# Patient Record
Sex: FEMALE | URBAN - METROPOLITAN AREA
[De-identification: names, ages, dates, MRNs, and addresses within clinical notes are randomized per-mention and may not be internally consistent; named-entity substitution may affect disease eponyms.]

---

## 2023-10-24 ENCOUNTER — SCHEDULED TELEPHONE ENCOUNTER (OUTPATIENT)
Age: 33
End: 2023-10-24
Payer: COMMERCIAL

## 2023-10-24 DIAGNOSIS — N62 MACROMASTIA: Primary | ICD-10-CM

## 2023-10-24 PROCEDURE — 99202 OFFICE O/P NEW SF 15 MIN: CPT | Performed by: PLASTIC SURGERY

## 2023-10-24 NOTE — PROGRESS NOTES
Plastic Surgery Consultation     Documentation:  I communicated with the patient and/or health care decision maker about symptomatic macromastia. Details of this discussion including any medical advice provided: bilateral breast reduction    Total Time: minutes: 21-30 minutes    Rm Elkins was evaluated through a synchronous (real-time) audio encounter. Patient identification was verified at the start of the visit. She (or guardian if applicable) is aware that this is a billable service, which includes applicable co-pays. This visit was conducted with the patient's (and/or legal guardian's) verbal consent. She has not had a related appointment within my department in the past 7 days or scheduled within the next 24 hours. The patient was located at Home: No address on file. .  The provider was located at [address],  [address] [place] [address]. Note: not billable if this call serves to triage the patient into an appointment for the relevant concern  {STOP! Confirm you are appropriately licensed, registered, or certified to deliver care in the state where the patient is located as indicated above. If you are not or unsure, please re-schedule the visit. Rm Elkins is a 28 y.o. female evaluated via telephone on 10/24/2023 for symptomatic macromastia. Jolie Escalante MD        Chief Complaint  Symptomatic Macromastia     Reason for visit  Discuss bilateral breast reduction     History of present illness  Nimesh Arroyo is a 28 y.o. female with a past medical history of anxiety, GERD, asthma, spinal stenosis , herniated cervical disc (s/p neck injections) ,  section and abdominal hernia repair who presents with signs and symptoms of macromastia.   Patient with a family history of breast cancer and at a high risk of breast cancer (Tonya Becker lifetime risk is 25.5%), at this time she wants to pursue the noninvasive management options for

## 2023-10-26 ENCOUNTER — TELEPHONE (OUTPATIENT)
Age: 33
End: 2023-10-26

## 2023-10-26 NOTE — TELEPHONE ENCOUNTER
SPOKE TO FOR [address] @ [facility].  NOTIFIED OF DATES, TIMES AND LOCATION    PHONE ASSESSMENT   SURGERY -12/1/23 AT 10AM  P/O -12/12/23 AT 1PM    NPO AFTER MIDNIGHT    HOLD BLOOD THINNERS - N/A

## 2023-11-28 RX ORDER — PANTOPRAZOLE SODIUM 40 MG/1
40 TABLET, DELAYED RELEASE ORAL DAILY
COMMUNITY
Start: 2023-07-29

## 2023-11-28 RX ORDER — SUMATRIPTAN 100 MG/1
100 TABLET, FILM COATED ORAL
COMMUNITY

## 2023-11-28 RX ORDER — LEVONORGESTREL 52 MG/1
1 INTRAUTERINE DEVICE INTRAUTERINE ONCE
COMMUNITY

## 2023-11-28 RX ORDER — HYDROXYZINE 50 MG/1
50 TABLET, FILM COATED ORAL EVERY 6 HOURS PRN
COMMUNITY

## 2023-11-28 RX ORDER — ALBUTEROL SULFATE 90 UG/1
2 AEROSOL, METERED RESPIRATORY (INHALATION) EVERY 6 HOURS PRN
COMMUNITY

## 2023-11-28 RX ORDER — LANOLIN ALCOHOL/MO/W.PET/CERES
CREAM (GRAM) TOPICAL
COMMUNITY

## 2023-11-30 ENCOUNTER — ANESTHESIA EVENT (OUTPATIENT)
Dept: OPERATING ROOM | Age: 33
End: 2023-11-30
Payer: COMMERCIAL

## 2023-12-01 ENCOUNTER — HOSPITAL ENCOUNTER (OUTPATIENT)
Age: 33
Setting detail: OUTPATIENT SURGERY
Discharge: HOME | End: 2023-12-01
Attending: PLASTIC SURGERY | Admitting: PLASTIC SURGERY
Payer: COMMERCIAL

## 2023-12-01 ENCOUNTER — ANESTHESIA (OUTPATIENT)
Dept: OPERATING ROOM | Age: 33
End: 2023-12-01
Payer: COMMERCIAL

## 2023-12-01 VITALS
RESPIRATION RATE: 18 BRPM | SYSTOLIC BLOOD PRESSURE: 122 MMHG | TEMPERATURE: 97.6 F | DIASTOLIC BLOOD PRESSURE: 72 MMHG | OXYGEN SATURATION: 97 % | HEIGHT: 66 IN | WEIGHT: 198 LBS | BODY MASS INDEX: 31.82 KG/M2 | HEART RATE: 68 BPM

## 2023-12-01 DIAGNOSIS — Z98.890 POST-OPERATIVE STATE: Primary | ICD-10-CM

## 2023-12-01 DIAGNOSIS — N62 MACROMASTIA: ICD-10-CM

## 2023-12-01 LAB
BASOPHILS ABSOLUTE: 0.1 K/CU MM
BASOPHILS RELATIVE PERCENT: 0.8 % (ref 0–1)
DIFFERENTIAL TYPE: ABNORMAL
EOSINOPHILS ABSOLUTE: 0.2 K/CU MM
EOSINOPHILS RELATIVE PERCENT: 1.9 % (ref 0–3)
HCT VFR BLD CALC: 37.3 % (ref 37–47)
HEMOGLOBIN: 12.9 GM/DL (ref 12.5–16)
IMMATURE NEUTROPHIL %: 0.3 % (ref 0–0.43)
LYMPHOCYTES ABSOLUTE: 3.2 K/CU MM
LYMPHOCYTES RELATIVE PERCENT: 41.7 % (ref 24–44)
MCH RBC QN AUTO: 32.7 PG (ref 27–31)
MCHC RBC AUTO-ENTMCNC: 34.6 % (ref 32–36)
MCV RBC AUTO: 94.7 FL (ref 78–100)
MONOCYTES ABSOLUTE: 0.6 K/CU MM
MONOCYTES RELATIVE PERCENT: 7.8 % (ref 0–4)
NUCLEATED RBC %: 0 %
PDW BLD-RTO: 12 % (ref 11.7–14.9)
PLATELET # BLD: 237 K/CU MM (ref 140–440)
PMV BLD AUTO: 10.1 FL (ref 7.5–11.1)
PREGNANCY TEST URINE, POC: NEGATIVE
RBC # BLD: 3.94 M/CU MM (ref 4.2–5.4)
SEGMENTED NEUTROPHILS ABSOLUTE COUNT: 3.7 K/CU MM
SEGMENTED NEUTROPHILS RELATIVE PERCENT: 47.5 % (ref 36–66)
TOTAL IMMATURE NEUTOROPHIL: 0.02 K/CU MM
TOTAL NUCLEATED RBC: 0 K/CU MM
WBC # BLD: 7.7 K/CU MM (ref 4–10.5)

## 2023-12-01 PROCEDURE — 6370000000 HC RX 637 (ALT 250 FOR IP): Performed by: ANESTHESIOLOGY

## 2023-12-01 PROCEDURE — 7100000000 HC PACU RECOVERY - FIRST 15 MIN: Performed by: PLASTIC SURGERY

## 2023-12-01 PROCEDURE — 7100000001 HC PACU RECOVERY - ADDTL 15 MIN: Performed by: PLASTIC SURGERY

## 2023-12-01 PROCEDURE — 3700000000 HC ANESTHESIA ATTENDED CARE: Performed by: PLASTIC SURGERY

## 2023-12-01 PROCEDURE — 6360000002 HC RX W HCPCS

## 2023-12-01 PROCEDURE — 7100000010 HC PHASE II RECOVERY - FIRST 15 MIN: Performed by: PLASTIC SURGERY

## 2023-12-01 PROCEDURE — 6360000002 HC RX W HCPCS: Performed by: ANESTHESIOLOGY

## 2023-12-01 PROCEDURE — 3700000001 HC ADD 15 MINUTES (ANESTHESIA): Performed by: PLASTIC SURGERY

## 2023-12-01 PROCEDURE — 2709999900 HC NON-CHARGEABLE SUPPLY: Performed by: PLASTIC SURGERY

## 2023-12-01 PROCEDURE — 7100000011 HC PHASE II RECOVERY - ADDTL 15 MIN: Performed by: PLASTIC SURGERY

## 2023-12-01 PROCEDURE — 88305 TISSUE EXAM BY PATHOLOGIST: CPT | Performed by: PATHOLOGY

## 2023-12-01 PROCEDURE — 3600000005 HC SURGERY LEVEL 5 BASE: Performed by: PLASTIC SURGERY

## 2023-12-01 PROCEDURE — 19318 BREAST REDUCTION: CPT | Performed by: PLASTIC SURGERY

## 2023-12-01 PROCEDURE — 2500000003 HC RX 250 WO HCPCS

## 2023-12-01 PROCEDURE — 2580000003 HC RX 258

## 2023-12-01 PROCEDURE — 85025 COMPLETE CBC W/AUTO DIFF WBC: CPT

## 2023-12-01 PROCEDURE — 3600000015 HC SURGERY LEVEL 5 ADDTL 15MIN: Performed by: PLASTIC SURGERY

## 2023-12-01 PROCEDURE — 81025 URINE PREGNANCY TEST: CPT

## 2023-12-01 PROCEDURE — 2580000003 HC RX 258: Performed by: ANESTHESIOLOGY

## 2023-12-01 RX ORDER — ONDANSETRON 4 MG/1
4 TABLET, ORALLY DISINTEGRATING ORAL 3 TIMES DAILY PRN
Qty: 21 TABLET | Refills: 0 | Status: SHIPPED | OUTPATIENT
Start: 2023-12-01

## 2023-12-01 RX ORDER — SODIUM CHLORIDE, SODIUM LACTATE, POTASSIUM CHLORIDE, CALCIUM CHLORIDE 600; 310; 30; 20 MG/100ML; MG/100ML; MG/100ML; MG/100ML
INJECTION, SOLUTION INTRAVENOUS CONTINUOUS
Status: DISCONTINUED | OUTPATIENT
Start: 2023-12-01 | End: 2023-12-01 | Stop reason: HOSPADM

## 2023-12-01 RX ORDER — ROCURONIUM BROMIDE 10 MG/ML
INJECTION, SOLUTION INTRAVENOUS PRN
Status: DISCONTINUED | OUTPATIENT
Start: 2023-12-01 | End: 2023-12-01 | Stop reason: SDUPTHER

## 2023-12-01 RX ORDER — HYDROCODONE BITARTRATE AND ACETAMINOPHEN 5; 325 MG/1; MG/1
1 TABLET ORAL ONCE
Status: COMPLETED | OUTPATIENT
Start: 2023-12-01 | End: 2023-12-01

## 2023-12-01 RX ORDER — PROPOFOL 10 MG/ML
INJECTION, EMULSION INTRAVENOUS CONTINUOUS PRN
Status: DISCONTINUED | OUTPATIENT
Start: 2023-12-01 | End: 2023-12-01 | Stop reason: SDUPTHER

## 2023-12-01 RX ORDER — PROPOFOL 10 MG/ML
INJECTION, EMULSION INTRAVENOUS PRN
Status: DISCONTINUED | OUTPATIENT
Start: 2023-12-01 | End: 2023-12-01 | Stop reason: SDUPTHER

## 2023-12-01 RX ORDER — FENTANYL CITRATE 50 UG/ML
INJECTION, SOLUTION INTRAMUSCULAR; INTRAVENOUS PRN
Status: DISCONTINUED | OUTPATIENT
Start: 2023-12-01 | End: 2023-12-01 | Stop reason: SDUPTHER

## 2023-12-01 RX ORDER — SODIUM CHLORIDE 0.9 % (FLUSH) 0.9 %
5-40 SYRINGE (ML) INJECTION PRN
Status: DISCONTINUED | OUTPATIENT
Start: 2023-12-01 | End: 2023-12-01 | Stop reason: HOSPADM

## 2023-12-01 RX ORDER — LABETALOL HYDROCHLORIDE 5 MG/ML
10 INJECTION, SOLUTION INTRAVENOUS
Status: DISCONTINUED | OUTPATIENT
Start: 2023-12-01 | End: 2023-12-01 | Stop reason: HOSPADM

## 2023-12-01 RX ORDER — DEXAMETHASONE SODIUM PHOSPHATE 4 MG/ML
INJECTION, SOLUTION INTRA-ARTICULAR; INTRALESIONAL; INTRAMUSCULAR; INTRAVENOUS; SOFT TISSUE PRN
Status: DISCONTINUED | OUTPATIENT
Start: 2023-12-01 | End: 2023-12-01 | Stop reason: SDUPTHER

## 2023-12-01 RX ORDER — ESMOLOL HYDROCHLORIDE 10 MG/ML
INJECTION INTRAVENOUS PRN
Status: DISCONTINUED | OUTPATIENT
Start: 2023-12-01 | End: 2023-12-01 | Stop reason: SDUPTHER

## 2023-12-01 RX ORDER — SODIUM CHLORIDE 9 MG/ML
INJECTION, SOLUTION INTRAVENOUS PRN
Status: DISCONTINUED | OUTPATIENT
Start: 2023-12-01 | End: 2023-12-01 | Stop reason: HOSPADM

## 2023-12-01 RX ORDER — OXYCODONE HYDROCHLORIDE 5 MG/1
5 TABLET ORAL EVERY 6 HOURS PRN
Qty: 20 TABLET | Refills: 0 | Status: SHIPPED | OUTPATIENT
Start: 2023-12-01 | End: 2023-12-08

## 2023-12-01 RX ORDER — SODIUM CHLORIDE 0.9 % (FLUSH) 0.9 %
5-40 SYRINGE (ML) INJECTION EVERY 12 HOURS SCHEDULED
Status: DISCONTINUED | OUTPATIENT
Start: 2023-12-01 | End: 2023-12-01 | Stop reason: HOSPADM

## 2023-12-01 RX ORDER — ONDANSETRON 2 MG/ML
4 INJECTION INTRAMUSCULAR; INTRAVENOUS
Status: DISCONTINUED | OUTPATIENT
Start: 2023-12-01 | End: 2023-12-01 | Stop reason: HOSPADM

## 2023-12-01 RX ORDER — DEXMEDETOMIDINE HYDROCHLORIDE 100 UG/ML
INJECTION, SOLUTION INTRAVENOUS PRN
Status: DISCONTINUED | OUTPATIENT
Start: 2023-12-01 | End: 2023-12-01 | Stop reason: SDUPTHER

## 2023-12-01 RX ORDER — DOCUSATE SODIUM 100 MG/1
100 CAPSULE, LIQUID FILLED ORAL DAILY PRN
Qty: 30 CAPSULE | Refills: 0 | Status: SHIPPED | OUTPATIENT
Start: 2023-12-01

## 2023-12-01 RX ORDER — ONDANSETRON 2 MG/ML
INJECTION INTRAMUSCULAR; INTRAVENOUS PRN
Status: DISCONTINUED | OUTPATIENT
Start: 2023-12-01 | End: 2023-12-01 | Stop reason: SDUPTHER

## 2023-12-01 RX ORDER — FENTANYL CITRATE 50 UG/ML
25 INJECTION, SOLUTION INTRAMUSCULAR; INTRAVENOUS EVERY 5 MIN PRN
Status: DISCONTINUED | OUTPATIENT
Start: 2023-12-01 | End: 2023-12-01 | Stop reason: HOSPADM

## 2023-12-01 RX ORDER — ONDANSETRON 2 MG/ML
4 INJECTION INTRAMUSCULAR; INTRAVENOUS
Status: COMPLETED | OUTPATIENT
Start: 2023-12-01 | End: 2023-12-01

## 2023-12-01 RX ORDER — HYDRALAZINE HYDROCHLORIDE 20 MG/ML
10 INJECTION INTRAMUSCULAR; INTRAVENOUS
Status: DISCONTINUED | OUTPATIENT
Start: 2023-12-01 | End: 2023-12-01 | Stop reason: HOSPADM

## 2023-12-01 RX ORDER — FENTANYL CITRATE 50 UG/ML
50 INJECTION, SOLUTION INTRAMUSCULAR; INTRAVENOUS EVERY 5 MIN PRN
Status: DISCONTINUED | OUTPATIENT
Start: 2023-12-01 | End: 2023-12-01 | Stop reason: HOSPADM

## 2023-12-01 RX ADMIN — ONDANSETRON 4 MG: 2 INJECTION INTRAMUSCULAR; INTRAVENOUS at 12:13

## 2023-12-01 RX ADMIN — PROPOFOL 50 MG: 10 INJECTION, EMULSION INTRAVENOUS at 10:25

## 2023-12-01 RX ADMIN — PROPOFOL 150 MG: 10 INJECTION, EMULSION INTRAVENOUS at 10:22

## 2023-12-01 RX ADMIN — SUGAMMADEX 50 MG: 100 INJECTION, SOLUTION INTRAVENOUS at 11:25

## 2023-12-01 RX ADMIN — SODIUM CHLORIDE, POTASSIUM CHLORIDE, SODIUM LACTATE AND CALCIUM CHLORIDE: 600; 310; 30; 20 INJECTION, SOLUTION INTRAVENOUS at 08:32

## 2023-12-01 RX ADMIN — FENTANYL CITRATE 50 MCG: 50 INJECTION, SOLUTION INTRAMUSCULAR; INTRAVENOUS at 12:19

## 2023-12-01 RX ADMIN — FENTANYL CITRATE 50 MCG: 50 INJECTION, SOLUTION INTRAMUSCULAR; INTRAVENOUS at 12:31

## 2023-12-01 RX ADMIN — FENTANYL CITRATE 50 MCG: 50 INJECTION, SOLUTION INTRAMUSCULAR; INTRAVENOUS at 10:20

## 2023-12-01 RX ADMIN — FENTANYL CITRATE 50 MCG: 50 INJECTION, SOLUTION INTRAMUSCULAR; INTRAVENOUS at 10:35

## 2023-12-01 RX ADMIN — ROCURONIUM BROMIDE 10 MG: 10 INJECTION, SOLUTION INTRAVENOUS at 10:38

## 2023-12-01 RX ADMIN — PROPOFOL 200 MCG/KG/MIN: 10 INJECTION, EMULSION INTRAVENOUS at 10:26

## 2023-12-01 RX ADMIN — DEXMEDETOMIDINE 10 MCG: 100 INJECTION, SOLUTION INTRAVENOUS at 10:13

## 2023-12-01 RX ADMIN — ONDANSETRON 4 MG: 2 INJECTION INTRAMUSCULAR; INTRAVENOUS at 11:20

## 2023-12-01 RX ADMIN — HYDROMORPHONE HYDROCHLORIDE 0.5 MG: 1 INJECTION, SOLUTION INTRAMUSCULAR; INTRAVENOUS; SUBCUTANEOUS at 11:47

## 2023-12-01 RX ADMIN — ESMOLOL HYDROCHLORIDE 40 MG: 100 INJECTION, SOLUTION INTRAVENOUS at 10:52

## 2023-12-01 RX ADMIN — DEXAMETHASONE SODIUM PHOSPHATE 8 MG: 4 INJECTION, SOLUTION INTRAMUSCULAR; INTRAVENOUS at 10:33

## 2023-12-01 RX ADMIN — CEFAZOLIN 2000 MG: 2 INJECTION, POWDER, FOR SOLUTION INTRAMUSCULAR; INTRAVENOUS at 10:35

## 2023-12-01 RX ADMIN — SUGAMMADEX 150 MG: 100 INJECTION, SOLUTION INTRAVENOUS at 11:41

## 2023-12-01 RX ADMIN — ROCURONIUM BROMIDE 40 MG: 10 INJECTION, SOLUTION INTRAVENOUS at 10:22

## 2023-12-01 RX ADMIN — ROCURONIUM BROMIDE 10 MG: 10 INJECTION, SOLUTION INTRAVENOUS at 11:17

## 2023-12-01 RX ADMIN — DEXMEDETOMIDINE 10 MCG: 100 INJECTION, SOLUTION INTRAVENOUS at 10:36

## 2023-12-01 RX ADMIN — HYDROCODONE BITARTRATE AND ACETAMINOPHEN 1 TABLET: 5; 325 TABLET ORAL at 13:42

## 2023-12-01 ASSESSMENT — PAIN DESCRIPTION - FREQUENCY
FREQUENCY: CONTINUOUS

## 2023-12-01 ASSESSMENT — PAIN SCALES - GENERAL
PAINLEVEL_OUTOF10: 7
PAINLEVEL_OUTOF10: 8
PAINLEVEL_OUTOF10: 7
PAINLEVEL_OUTOF10: 10
PAINLEVEL_OUTOF10: 5
PAINLEVEL_OUTOF10: 10

## 2023-12-01 ASSESSMENT — PAIN - FUNCTIONAL ASSESSMENT
PAIN_FUNCTIONAL_ASSESSMENT: PREVENTS OR INTERFERES SOME ACTIVE ACTIVITIES AND ADLS
PAIN_FUNCTIONAL_ASSESSMENT: 0-10

## 2023-12-01 ASSESSMENT — PAIN DESCRIPTION - LOCATION
LOCATION: BREAST
LOCATION: CHEST;BREAST
LOCATION: BREAST

## 2023-12-01 ASSESSMENT — PAIN DESCRIPTION - ONSET
ONSET: ON-GOING

## 2023-12-01 ASSESSMENT — PAIN DESCRIPTION - DESCRIPTORS
DESCRIPTORS: BURNING

## 2023-12-01 ASSESSMENT — PAIN DESCRIPTION - ORIENTATION
ORIENTATION: RIGHT;LEFT
ORIENTATION: LEFT;RIGHT
ORIENTATION: RIGHT;LEFT

## 2023-12-01 ASSESSMENT — PAIN DESCRIPTION - PAIN TYPE
TYPE: SURGICAL PAIN

## 2023-12-01 NOTE — H&P
Plastic Surgery      Chief Complaint  Symptomatic Macromastia    Reason for visit  Discuss bilateral breast reduction    History of present illness  Evgeny Ferreira is a 28 y.o. female with a past medical history of anxiety, GERD, asthma, spinal stenosis , herniated cervical disc (s/p neck injections) ,  section and abdominal hernia repair who presents with signs and symptoms of macromastia. Patient with a family history of breast cancer and at a high risk of breast cancer ([facility] lifetime risk is 25.5%), at this time she wants to pursue the noninvasive management options for annual high risk screening but would like to discuss a breast reduction to address her macromastia. She reports 38DDD breasts which have been stable in size over the last year. She states that her breasts cause a pulling sensation on the neck and upper back leading to pain. She reports persistent redness and erythema below the breasts but has not been treated for infections in the medical setting (i.e., has not required topical or oral antifungal agents) and has not developed areas of skin ulceration that required local wound care. She notes marked bra strap grooving and must wear two athletic bras to facilitate work/normal daily activities. She has attempted nonsteroidal anti-inflammatory drugs to treat her pain without sustained or substantial improvement. She reports she has stopped smoking 2 months ago and denies any alcohol use. Patient seen and examined denies any chest pain, shortness of breath or history of DVT. No interval changes since last visit. BREAST - REVIEW OF SYSTEMS  Neck pain  Shoulder pain  Upper or lower back pain  Headaches  Persistent redness or erythema below the breasts  Restriction of physical activity due to breast size  Severe bra strap grooving or ulceration at shoulder  Upper back slouching or hunching    REVIEW OF SYSTEMS  Review of Systems   Constitutional: Negative.    Respiratory: nontender, nondistended without significant surgical scar. Extremities: Palpable peripheral pulses without edema. Laboratory studies  All lab studies reviewed. Radiology  Anatomical Region Laterality Modality   Breast bilateral Mammography     Impression    CATEGORY/ASSESSMENT/IMPRESSION: (2) Benign. RECOMMENDATION: Follow-up screening mammography is recommended in one year. DICTATED BY: Kristin Kumar. Juan Salas M.D.    Stefanikayleen Bee result summary letter will be sent to the patient. The patient has been entered into a reminder system with a target due date for the next mammogram.Workstation ID:[id number]  Narrative    Digital bilateral diagnostic mammography with CAD with tomosynthesis. 9/30/2022 2:05 PM.    INDICATION: Follow-up of suspected left breast intramammary lymph node. COMPARISON: 11/16/21, 10/14/21, 07/15/20, 05/31/19. VIEWS: Bilateral CC and MLO. BREAST COMPOSITION: There are scattered fibroglandular elements. FINDINGS: There is no suspicious finding present within either breast. Subcentimeter circumscribed mass within the upper inner quadrant of the left breast is unchanged and most consistent with a benign intramammary lymph node. Procedure Note    Kari Rangel MD - 09/30/2022  Formatting of this note might be different from the original.  Digital bilateral diagnostic mammography with CAD with tomosynthesis. 9/30/2022 2:05 PM.    INDICATION: Follow-up of suspected left breast intramammary lymph node. COMPARISON: 11/16/21, 10/14/21, 07/15/20, 05/31/19. VIEWS: Bilateral CC and MLO. BREAST COMPOSITION: There are scattered fibroglandular elements. FINDINGS: There is no suspicious finding present within either breast. Subcentimeter circumscribed mass within the upper inner quadrant of the left breast is unchanged and most consistent with a benign intramammary lymph node. CATEGORY/ASSESSMENT/IMPRESSION: (2) Benign.     RECOMMENDATION: Follow-up screening mammography is

## 2023-12-01 NOTE — ANESTHESIA POSTPROCEDURE EVALUATION
Department of Anesthesiology  Postprocedure Note    Patient: René De Anda  MRN: [id number]  YOB: 1990  Date of evaluation: 12/1/2023      Procedure Summary     Date: 12/01/23 Room / Location: [address] [place] [address] [address] [facility]    Anesthesia Start: 0244 Anesthesia Stop: 9852    Procedure: BILATERAL BREAST REDUCTION (Bilateral: Breast) Diagnosis:       Macromastia      (Macromastia [N62])    Surgeons: Jose Ramon Saucedo MD Responsible Provider: Darya Tovar MD    Anesthesia Type: general ASA Status: 2          Anesthesia Type: No value filed.     Gregorio Phase I: Gregorio Score: 10    Gregorio Phase II:        Anesthesia Post Evaluation    Patient location during evaluation: PACU  Patient participation: complete - patient participated  Level of consciousness: sleepy but conscious  Pain score: 3  Airway patency: patent  Nausea & Vomiting: no nausea and no vomiting  Complications: no  Cardiovascular status: hemodynamically stable  Respiratory status: acceptable  Hydration status: euvolemic  Pain management: adequate

## 2023-12-01 NOTE — PROGRESS NOTES
1310 - received patient from pacu, report received from [facility]. Patient A&O, rates pain 10/10, feeling slightly nauseated from pain. Patient wearing surgical bra and has 2 darshana drains. Steri strips on incisions have a small amount of blood. [phone] - notified Dr. Miriam Lei regarding pain, orders received. 1342 patient given pain meds see mar  [phone] - Discharge instructions given to patient and family, understanding voiced without any further questions at this time. Instructions given on darshana drain care. 1445 - patient up to bathroom. [phone] - patient left sds via wheelchair accompanied by tech.

## 2023-12-01 NOTE — PROGRESS NOTES
Outpatient Pharmacy Progress Note for Meds-to-Beds    Total number of Prescriptions Filled: 3  The following medications were dispensed to the patient during the discharge process:  docusate sodium  ondansetron  oxyCODONE    Additional Documentation:  Medication(s) were delivered to the patient's room prior to discharge      Thank you for letting us serve your patients.   [address]    [address] [place] [address], [address] [place] [address]    Phone: [phone]    Fax: [phone]

## 2023-12-01 NOTE — PROGRESS NOTES
1201 Patient arrived to PACU from OR. Monitors applied and alarms on. No drainage from sites. Report from CRNA. 1215 Patient turned and repositioned in bed.  1240 Patient tolerating ice chips. 1302 Patient transferred out of PACU to same day surgery. Report to Lorena Gaxiola.

## 2023-12-01 NOTE — OP NOTE
DATE OF OPERATION:   12/1/2023       SURGEON:   Jose Ramon Saucedo MD      OPERATION PERFORMED:   BBR    PREOPERATIVE DIAGNOSIS:   Symptomatic Macromastia    POSTOPERATIVE DIAGNOSIS:   JADA    DRAINS (KIND AND NUMBER):  2 BRENDA drains; 1 at either breast   SPONGE COUNT VERIFIED:   Verified yes, and correct. ESTIMATED BLOOD LOSS:   300  IMPLANTS:    none  COMPLICATIONS:    None. WOUND TYPE:    Clean  CONDITION:     Good. Complications:   none    Condition:   stable    INDICATIONS FOR PROCEDURE:32 year old female with symptomatic macromastia presents for bilateral breast reduction. All of her questions were answered and she elected to proceed. Consent was verified. DESCRIPTION OF OPERATION:   Patient was brought into the OR and transferred onto the OR table in a supine fashion. Pressure points were padded, spontaneous compression devices were placed. Pre-operative antibiotic were given. General endotracheal anesthesia was induced, patient tolerated induction well. Patient was prepped and draped in the usual sterile fashion and a final time out was performed. A superior medial pedicle with an 7cm base was marked within the Wise pattern. To do this, a 42-mm cookie cutter was used to outline the new nipple-areolar complex. Then, the pedicle was de-epithelialized. Next, the incisions were made and the pedicle was developed to the chest wall. The tissue superior to the new nipple areola complex and within the markings of the wise pattern resection were excised. The right breast was bigger and the resection was larger . The right resection weight was 1000 grams and the left 800grams. Due to bilateral nature of the case and to limit general anesthesia time Dr. Lopez Deal completed the left side and will be dictated by him. Symmetry was confirmed. The wound was thoroughly irrigated, the irrigation removed via suction, and hemostasis achieved with cautery.  The pedicle was secured superiorly and the

## 2023-12-01 NOTE — OP NOTE
DATE OF OPERATION:   12/1/2023        SURGEON:   Elaine Swift MD        OPERATION PERFORMED:   Bilateral breast reduction     PREOPERATIVE DIAGNOSIS:   Symptomatic Macromastia     POSTOPERATIVE DIAGNOSIS:   JADA     DRAINS (KIND AND NUMBER):       2 BRENDA drains; 1 at either breast   SPONGE COUNT VERIFIED:            Verified yes, and correct. ESTIMATED BLOOD LOSS:             300  IMPLANTS:                                        none  COMPLICATIONS:                            None. WOUND TYPE:                                  Clean  CONDITION:                                      Good. Complications:   none     Condition:   stable     INDICATIONS FOR PROCEDURE:32 year old female with symptomatic macromastia presents for bilateral breast reduction. All of her questions were answered and she elected to proceed. Consent was verified. DESCRIPTION OF OPERATION:   Patient was brought into the OR and transferred onto the OR table in a supine fashion. Pressure points were padded, spontaneous compression devices were placed. Pre-operative antibiotic were given. General endotracheal anesthesia was induced, patient tolerated induction well. Patient was prepped and draped in the usual sterile fashion and a final time out was performed. A superior medial pedicle with an 7cm base was marked within the Wise pattern. To do this, a 42-mm cookie cutter was used to outline the new nipple-areolar complex. Then, the pedicle was de-epithelialized. Next, the incisions were made and the pedicle was developed to the chest wall. The tissue superior to the new nipple areola complex and within the markings of the wise pattern resection were excised. The right breast was bigger and the resection was larger . The right resection weight was 1000 grams and the left 800grams.  Due to bilateral nature of the case and to limit general anesthesia time Dr. Pernell Pearson completed the right side and will be dictated by

## 2023-12-01 NOTE — DISCHARGE INSTRUCTIONS
Discharge Instructions      Diet, Nausea, and Vomiting  Remain on clear liquids, non-carbonated beverages, and sparing solid foods the first 12 hours after surgery. After 12 hours, you may resume a regular diet. Nausea and vomiting are common after surgery. The nausea usually resolves within 24 hours. If limiting your diet does not improve the nausea, you will be provided with a medicine to reduce nausea called Zofran. This is to be taken only as needed. Pain Management  Making you comfortable after surgery is a priority for the entire health care team and steps are taken during each step of your care to aid in achieving this goal.  You will be given numbing medicine along the incisions at the time of surgery - this medicine is designed to last up to 3 days. Non-medication steps to take:  Wear your surgical bra/spanks at all times except for showering  Medications provided: It is important to take the non-narcotic and narcotic pain medications after surgery as they work together. Over the counter Tylenol 650mg should be taken every 8 hours on a scheduled basis after surgery for 3 days  Over the counter Motrin 600mg may be taken as needed for pain starting on the second day after surgery  Oxycodone will be provided and can be taken every 6 hours as needed for pain - this should be taken as an adjunct to the steps and medications listed above. This medicine will not be refilled after surgery. Please keep in mind that all narcotic pain medicine has side effects. Side effects may include dizziness, drowsiness, light-headedness, constipation, nausea or vomiting. If the side effects continue or are bothersome, quit taking the medication and use the steps above until you can contact the clinic. Wound Care  Tape: Tape strips (i.e., steri-strips) were applied to the skin overlying the incision. These will come off by themselves in 10-14 days as you begin to shower.  As they start to peel off on their own, you may gently remove them. Bathing: You can shower, but not bathe, beginning 48 hours after surgery was completed. Do not let the incisions sit underwater. This means no swimming, Jacuzzi, or deep baths where the incisions are underwater. Infection: It is normal to get a little redness at the edges of a wound as it heals. However, if you have an increase in pain, temperature greater than 101.5, redness and warmth of the wound, with or without drainage from the wound, the wound may be infected and you should call the office to be seen. Activity  You should avoid strenuous activity but are encouraged to ambulate with help beginning the night of surgery  AVOID: Running, pushing, aerobics, horseback riding, mopping, sweeping, weight lifting, and upper body workouts until given permission by your surgeon. It is normal to get tired very easily after surgery and to have trouble sleeping at night - these problems can last up to six weeks. DRIVING CAUTION: DO NOT DRIVE WITHIN 24 HOURS OF YOUR SURGERY OR WHILE TAKING ANY PRESCRIBED MEDICATIONS.      If you have any questions or concerns please contact your physician or call:     Patt Grider MD  Plastic and Reconstructive Surgeon  [facility]   [phone]    Plastic and Reconstructive Surgery   BRENDA Drain Log     Name:  Date of Birth:        Surgery Date:  Date Time                 Amount                       Drain #1                  Drain #2           Drain   #3 Drain   #4 Drain   #5 Drain  #6

## 2023-12-04 ENCOUNTER — TELEPHONE (OUTPATIENT)
Dept: SURGERY | Age: 33
End: 2023-12-04

## 2023-12-04 NOTE — TELEPHONE ENCOUNTER
Post-op Phone Call Follow-up  JESSICA Pitt Rump is 3 days s/p 3    I called the pt today to see how they were doing post-operatively. The pt's pain is  well controlled with current pain control regimen. Pt's incisions are doing well. Denies erythema, swelling or drainage. Pt is tolerating eating without N/V, and is  having bowel movements. I reviewed the post-operative instructions, addressed any concerns and answered the patient's questions.      I confirmed the patient's post-op appointment on 12/12/23    Pt Stated called [facility] Office due to discomfort when taking a deep breath and nipple is stuck to nicole Yin LPN

## 2023-12-05 ENCOUNTER — OFFICE VISIT (OUTPATIENT)
Age: 33
End: 2023-12-05

## 2023-12-05 ENCOUNTER — HOSPITAL ENCOUNTER (OUTPATIENT)
Dept: GENERAL RADIOLOGY | Age: 33
Discharge: HOME | End: 2023-12-05
Attending: PLASTIC SURGERY
Payer: COMMERCIAL

## 2023-12-05 ENCOUNTER — HOSPITAL ENCOUNTER (OUTPATIENT)
Age: 33
Discharge: HOME | End: 2023-12-05
Attending: PLASTIC SURGERY
Payer: COMMERCIAL

## 2023-12-05 ENCOUNTER — HOSPITAL ENCOUNTER (OUTPATIENT)
Dept: ULTRASOUND IMAGING | Age: 33
Discharge: HOME | End: 2023-12-05
Attending: PLASTIC SURGERY
Payer: COMMERCIAL

## 2023-12-05 ENCOUNTER — TELEPHONE (OUTPATIENT)
Dept: SURGERY | Age: 33
End: 2023-12-05

## 2023-12-05 VITALS — HEIGHT: 66 IN | BODY MASS INDEX: 31.82 KG/M2 | WEIGHT: 198 LBS

## 2023-12-05 DIAGNOSIS — Z98.890 POST-OPERATIVE STATE: ICD-10-CM

## 2023-12-05 DIAGNOSIS — G89.18 POST-OPERATIVE PAIN: Primary | ICD-10-CM

## 2023-12-05 DIAGNOSIS — Z98.890 POST-OPERATIVE STATE: Primary | ICD-10-CM

## 2023-12-05 DIAGNOSIS — I82.A19 ACUTE DEEP VEIN THROMBOSIS (DVT) OF AXILLARY VEIN, UNSPECIFIED LATERALITY (HCC): ICD-10-CM

## 2023-12-05 DIAGNOSIS — I82.A19 ACUTE DEEP VEIN THROMBOSIS (DVT) OF AXILLARY VEIN, UNSPECIFIED LATERALITY (HCC): Primary | ICD-10-CM

## 2023-12-05 PROCEDURE — 71046 X-RAY EXAM CHEST 2 VIEWS: CPT

## 2023-12-05 PROCEDURE — 99024 POSTOP FOLLOW-UP VISIT: CPT | Performed by: PLASTIC SURGERY

## 2023-12-05 PROCEDURE — 93970 EXTREMITY STUDY: CPT

## 2023-12-05 RX ORDER — DIAZEPAM 5 MG/1
5 TABLET ORAL EVERY 8 HOURS PRN
Qty: 20 TABLET | Refills: 0 | Status: SHIPPED | OUTPATIENT
Start: 2023-12-05 | End: 2023-12-15

## 2023-12-05 NOTE — TELEPHONE ENCOUNTER
Per Dr. Joshua Begum to have CXR prior to office visit, informed PT she stated she will be getting the CXR ASAP today

## 2023-12-05 NOTE — TELEPHONE ENCOUNTER
Lloyd Joseph called stating she is having some SOB, hurts on the left side when breathing, not having any wheezing , but using her inhaler (not helping much). Requesting a Chest xray prior to visit today. Informed PT to keep her appt with Dr. Logan Umanzor today , he will evaluate her at the time of her visit.

## 2023-12-05 NOTE — PROGRESS NOTES
Plastic Surgery Post op Note:               Plastic Surgery Post Op Note:     Patient is a 4 days s/p bilateral breast reduction. Reports left side is tight and hard to breath. CXR normal and U/S of BLE is negative. Drains with minimal drainage. Vitals:    12/05/23 1502   BP: Comment: pt declined due to pain   Weight: 89.8 kg (198 lb)   Height: 1.676 m (5' 6\")    NAD  NSR   Breathing is non labored on rA   Excellent breast symmetry, nipples sensate and viable. Drains removed. Incisions covered with steri-strips and minimal strikethrough. EXAMINATION:  TWO X-RAY VIEWS OF THE CHEST     12/5/2023 1:10 pm     COMPARISON:  None     HISTORY:  ORDERING SYSTEM PROVIDED HISTORY:  Post-operative state  TECHNOLOGIST PROVIDED HISTORY:  Reason for Exam:  Shortness of breath  Reason for Exam:  Pt states she had a breast reduction last Friday, pain on  left chest, SOB, left breast is hard and not draining like the right breast.     FINDINGS:  The cardiomediastinal silhouette is normal in size. The lungs are clear. No pleural effusion or pneumothorax is present. No evidence of subdiaphragmatic free air or pneumomediastinum. IMPRESSION:  No acute cardiopulmonary process. Plan:      Continue with lifting restrictions. Allow steri-strips to peel off spontaneously. No lifting <10lbs for another 3 weeks  Will give Valium for anxiety and spasm  RTC 2 weeks.

## 2023-12-11 ENCOUNTER — TELEPHONE (OUTPATIENT)
Dept: SURGERY | Age: 33
End: 2023-12-11

## 2023-12-11 NOTE — TELEPHONE ENCOUNTER
Pt called stating \"fishy\" oder coming from left breast incision. Yellow- green discharge, blush and puffy. Pt denies fever at this time. Dr. Bo Bradley will be informed     Pt instructed to remove steri strips, wash with soap-water and to keep dry.

## 2023-12-12 RX ORDER — CEFADROXIL 500 MG/1
500 CAPSULE ORAL 2 TIMES DAILY
Qty: 20 CAPSULE | Refills: 0 | Status: SHIPPED | OUTPATIENT
Start: 2023-12-12 | End: 2023-12-22

## 2023-12-14 ENCOUNTER — TELEPHONE (OUTPATIENT)
Dept: SURGERY | Age: 33
End: 2023-12-14

## 2023-12-14 NOTE — TELEPHONE ENCOUNTER
PT called stated Right incision line is bleeding , pitures were sent and forwarded to Dr. Logan Umanzor. Per Dr. Logan Umanzor it is old blood and once drained, bleeding will stop. Pt was advise and educated on post op care.

## 2023-12-31 PROBLEM — Z98.890 POST-OPERATIVE STATE: Status: RESOLVED | Noted: 2023-12-01 | Resolved: 2023-12-31

## 2024-01-30 ENCOUNTER — TELEPHONE (OUTPATIENT)
Dept: BARIATRICS/WEIGHT MGMT | Age: 34
End: 2024-01-30

## 2024-01-30 ENCOUNTER — OFFICE VISIT (OUTPATIENT)
Age: 34
End: 2024-01-30

## 2024-01-30 VITALS — BODY MASS INDEX: 30.25 KG/M2 | WEIGHT: 188.2 LBS | HEART RATE: 62 BPM | OXYGEN SATURATION: 97 % | HEIGHT: 66 IN

## 2024-01-30 DIAGNOSIS — Z48.817 AFTERCARE FOLLOWING SURGERY OF THE SKIN OR SUBCUTANEOUS TISSUE: Primary | ICD-10-CM

## 2024-01-30 PROCEDURE — 99024 POSTOP FOLLOW-UP VISIT: CPT | Performed by: PLASTIC SURGERY

## 2024-01-30 RX ORDER — AMOXICILLIN AND CLAVULANATE POTASSIUM 875; 125 MG/1; MG/1
1 TABLET, FILM COATED ORAL 2 TIMES DAILY
Qty: 14 TABLET | Refills: 0 | Status: SHIPPED | OUTPATIENT
Start: 2024-01-30 | End: 2024-02-06

## 2024-01-30 NOTE — TELEPHONE ENCOUNTER
Patient called in said that antibiotics usually give her a yeast infection wanted to know if something could be called in for that.  Per Dr. Hwang please call in diflucan 150 mg for 3 days.

## 2024-01-30 NOTE — PROGRESS NOTES
Plastic Surgery Post op Note:               Plastic Surgery Post Op Note:     Patient is 6 weeks from bilateral breast reduction.  Patient reports that she has some tenderness and a mass in the retroareolar areolar area to the left breast.  No drainage.  Reports prior areas of delayed wound healing have since healed.    Vitals:    01/30/24 1439   Pulse: 62   SpO2: 97%   Weight: 85.4 kg (188 lb 3.2 oz)   Height: 1.676 m (5' 6\")    NAD  NSR   Breathing is non labored on rA   Excellent symmetry.  Nipple sensate and viable.  Incisions all healed.  On the left over the retroareolar towards the chest wall laterally approximately 5 cm area of induration and tenderness likely area of fat necrosis.      Plan:  I discussed the patient I will give her prescription for Augmentin  For now we will plan to observe the area.  In 6 months from surgery patient will need a mammogram to evaluate and document the area of likely fat necrosis.  I discussed that if I was to intervene on the right now the area of excision will be massive and leave her with a large divot.  I suspect with antibiotics and time the area of fat necrosis will be minimal and may not any need excision.  Continue to monitor.  Did not massage the area and let itself resolved.    Chastity Hwang MD

## 2024-01-30 NOTE — TELEPHONE ENCOUNTER
I called in Diflucan 150 mg QD for 3 days to [facility] in [place], per Dr Hwang.    Called and LM to inform Sandra that Diflucan was sent to pharmacy.

## 2024-02-09 ENCOUNTER — OFFICE VISIT (OUTPATIENT)
Age: 34
End: 2024-02-09

## 2024-02-09 VITALS
SYSTOLIC BLOOD PRESSURE: 130 MMHG | HEART RATE: 80 BPM | OXYGEN SATURATION: 100 % | HEIGHT: 66 IN | DIASTOLIC BLOOD PRESSURE: 80 MMHG | BODY MASS INDEX: 30.22 KG/M2 | WEIGHT: 188 LBS

## 2024-02-09 DIAGNOSIS — Z48.817 AFTERCARE FOLLOWING SURGERY OF THE SKIN OR SUBCUTANEOUS TISSUE: Primary | ICD-10-CM

## 2024-02-09 RX ORDER — CEFADROXIL 500 MG/1
500 CAPSULE ORAL 2 TIMES DAILY
Qty: 14 CAPSULE | Refills: 0 | Status: SHIPPED | OUTPATIENT
Start: 2024-02-09 | End: 2024-02-16

## 2024-02-09 NOTE — PROGRESS NOTES
PLASTIC SURGERY FOLLOW UP NOTE     2/9/2024 10:05 AM    Subjective  Sandra Vidal is a 33 y.o. female s/p bilateral breast reduction (12/1/2023). Since surgery, she has noted a palpable area at the left chest. At her last follow up appointment, she was prescribed antibiotics. Today, the patient reports a reaction to the antibiotics.    Objective  Vitals:    02/09/24 1008   BP: 130/80   Pulse: 80   SpO2: 100%   Weight: 85.3 kg (188 lb)   Height: 1.676 m (5' 6\")     Body mass index is 30.34 kg/m².  Body surface area is 1.99 meters squared.    GEN: NAD    Left beast: NAC viable and in good position. Incisions well approximated. No wounds at the IMF. Palpable nodule centrally consistent with prior visit. Mild erythema at lower portion of breast primarily.     Right breast: NAC viable and in good position. Incisions well approximated. No wounds at the IMF. Mild erythema at lower portion of breast primarily.     Assessment and Plan  Sandra Vidal is a 33 y.o. female s/p bilateral breast reduction 9 weeks ago. Will change to Duricef as the patient tolerated this without issue in December. She agreed and will follow up in 6-8 weeks.      Manohar Haywood MD, [facility]  Plastic & Reconstructive Surgeon  [phone]

## 2024-03-26 ENCOUNTER — OFFICE VISIT (OUTPATIENT)
Age: 34
End: 2024-03-26
Payer: COMMERCIAL

## 2024-03-26 VITALS
HEART RATE: 73 BPM | OXYGEN SATURATION: 95 % | SYSTOLIC BLOOD PRESSURE: 108 MMHG | BODY MASS INDEX: 30.67 KG/M2 | DIASTOLIC BLOOD PRESSURE: 72 MMHG | HEIGHT: 66 IN | WEIGHT: 190.8 LBS

## 2024-03-26 DIAGNOSIS — M79.89 FAT NECROSIS: Primary | ICD-10-CM

## 2024-03-26 PROCEDURE — G8484 FLU IMMUNIZE NO ADMIN: HCPCS | Performed by: PLASTIC SURGERY

## 2024-03-26 PROCEDURE — 99212 OFFICE O/P EST SF 10 MIN: CPT | Performed by: PLASTIC SURGERY

## 2024-03-26 PROCEDURE — G8427 DOCREV CUR MEDS BY ELIG CLIN: HCPCS | Performed by: PLASTIC SURGERY

## 2024-03-26 PROCEDURE — 1036F TOBACCO NON-USER: CPT | Performed by: PLASTIC SURGERY

## 2024-03-26 PROCEDURE — G8417 CALC BMI ABV UP PARAM F/U: HCPCS | Performed by: PLASTIC SURGERY

## 2024-03-26 NOTE — PROGRESS NOTES
Plastic Surgery Post op Note:               Plastic Surgery     Patient is now 3 months from bilateral breast reduction.  She developed some hypertrophic scarring to the right lateral breast and also area of fat necrosis to the left medial breast.  Before this area involve all of her retroareolar region but now is inferiorly to that and is smaller than before.  She had an MRI which is consistent with the findings.  She is regaining sensation to her nipples.    Vitals:    03/26/24 1349   BP: 108/72   Pulse: 73   SpO2: 95%   Weight: 86.5 kg (190 lb 12.8 oz)   Height: 1.676 m (5' 6\")    NAD  NSR   Breathing is non labored on rA   Excellent symmetry.  Area of induration and tenderness consistent with fat necrosis to the lateral inferior pole of the left breast.  Measures approximately 4 x 3 cm.  Plan:  Continue with gentle scar massage.  In regards to her area of fat necrosis some hesitant to intervene at this point as it is continuing to get smaller and at this point it would be difficult to excise that and rearranged tissue without creating a substantial defect to the breast.  I told the patient I will see her back in 1 year from surgery to evaluate her area of fat necrosis.    Chastity Hwang MD

## (undated) DEVICE — SUTURE PROL SZ 2-0 L30IN NONABSORBABLE BLU L26MM CT-1 1/2 8423H

## (undated) DEVICE — 3M™ IOBAN™ 2 ANTIMICROBIAL INCISE DRAPE 6650EZ: Brand: IOBAN™ 2

## (undated) DEVICE — SUTURE VCRL SZ 3-0 L18IN ABSRB UD L26MM SH 1/2 CIR J864D

## (undated) DEVICE — RESERVOIR,SUCTION,100CC,SILICONE: Brand: MEDLINE

## (undated) DEVICE — PENCIL ES CRD L10FT HND SWCHING ROCK SWCH W/ EDGE COAT BLDE

## (undated) DEVICE — LIQUIBAND RAPID ADHESIVE 36/CS 0.8ML: Brand: MEDLINE

## (undated) DEVICE — INTENDED FOR TISSUE SEPARATION, AND OTHER PROCEDURES THAT REQUIRE A SHARP SURGICAL BLADE TO PUNCTURE OR CUT.: Brand: BARD-PARKER ® STAINLESS STEEL BLADES

## (undated) DEVICE — SYRINGE IRRIG 60ML SFT PLIABLE BLB EZ TO GRP 1 HND USE W/

## (undated) DEVICE — GLOVE ORANGE PI 7   MSG9070

## (undated) DEVICE — Device

## (undated) DEVICE — SPONGE LAP W18XL18IN WHT COT 4 PLY FLD STRUNG RADPQ DISP ST 2 PER PACK

## (undated) DEVICE — SUTURE MCRYL SZ 4-0 L18IN ABSRB UD L19MM PS-2 3/8 CIR PRIM Y496G

## (undated) DEVICE — PACK,UNIVERSAL,NO GOWNS: Brand: MEDLINE

## (undated) DEVICE — TUBING, SUCTION, 9/32" X 10', STRAIGHT: Brand: MEDLINE

## (undated) DEVICE — COUNTER NDL 30 COUNT FOAM STRP SGL MAG

## (undated) DEVICE — ELECTRODE ES L2.75IN S STL INSUL BLDE W/ SL EDGE

## (undated) DEVICE — COVER,MAYO STAND,STERILE: Brand: MEDLINE

## (undated) DEVICE — MARKER SURG SKIN UTIL REGULAR/FINE 2 TIP W/ RUL AND 9 LBL

## (undated) DEVICE — STRIP SKIN CLSR W1XL5IN NYLON REINF CURAD STERIL

## (undated) DEVICE — DRAIN SURG 15FR SIL RND CHN W/ TRCR FULL FLUT DBL WRP TRAD

## (undated) DEVICE — YANKAUER,FLEXIBLE HANDLE,REGLR CAPACITY: Brand: MEDLINE INDUSTRIES, INC.

## (undated) DEVICE — SUTURE MCRYL SZ 3-0 L27IN ABSRB UD L24MM PS-1 3/8 CIR PRIM Y936H

## (undated) DEVICE — GOWN,SIRUS,POLYRNF,BRTHSLV,XLN/XL,20/CS: Brand: MEDLINE

## (undated) DEVICE — TOWEL,OR,DSP,ST,BLUE,STD,6/PK,12PK/CS: Brand: MEDLINE